# Patient Record
Sex: MALE | Race: BLACK OR AFRICAN AMERICAN
[De-identification: names, ages, dates, MRNs, and addresses within clinical notes are randomized per-mention and may not be internally consistent; named-entity substitution may affect disease eponyms.]

---

## 2017-06-29 ENCOUNTER — HOSPITAL ENCOUNTER (EMERGENCY)
Dept: HOSPITAL 17 - NEPE | Age: 52
LOS: 1 days | Discharge: HOME | End: 2017-06-30
Payer: SELF-PAY

## 2017-06-29 VITALS — HEIGHT: 71 IN | BODY MASS INDEX: 27.16 KG/M2 | WEIGHT: 194.01 LBS

## 2017-06-29 VITALS
HEART RATE: 90 BPM | TEMPERATURE: 98.3 F | RESPIRATION RATE: 20 BRPM | OXYGEN SATURATION: 99 % | DIASTOLIC BLOOD PRESSURE: 92 MMHG | SYSTOLIC BLOOD PRESSURE: 165 MMHG

## 2017-06-29 DIAGNOSIS — I25.2: ICD-10-CM

## 2017-06-29 DIAGNOSIS — I10: ICD-10-CM

## 2017-06-29 DIAGNOSIS — Z86.73: ICD-10-CM

## 2017-06-29 DIAGNOSIS — E10.9: ICD-10-CM

## 2017-06-29 DIAGNOSIS — Z79.4: ICD-10-CM

## 2017-06-29 DIAGNOSIS — N47.2: Primary | ICD-10-CM

## 2017-06-29 DIAGNOSIS — Z79.899: ICD-10-CM

## 2017-06-29 DIAGNOSIS — G47.30: ICD-10-CM

## 2017-06-29 PROCEDURE — 54235 NJX CORPORA CAVERNOSA RX AGT: CPT

## 2017-06-29 NOTE — PD
HPI


Chief Complaint:   Complaint


Time Seen by Provider:  22:25


Travel History


International Travel<30 days:  No


Contact w/Intl Traveler<30days:  No


Traveled to known affect area:  No





History of Present Illness


HPI


51-year-old male type 1 diabetes presents for evaluation of penile pain.  He 

reports that he retracted his foreskin 12 hours ago in order to clean his 

penis.  He was unable to replace the foreskin back into its anatomical position 

afterwards.  He has developed some pain and edema to the glans penis and to the 

foreskin.  The pain is moderate, aggravated by palpation, throbbing.  This 

happened once in the past, he reports that the symptoms improved on their own.  

He has no other complaints.





PFSH


Past Medical History


Cerebrovascular Accident:  Yes (CVA X2)


Diabetes:  Yes


Patient Takes Glucophage:  No


Diminished Hearing:  No


Hypertension:  Yes


Medical other:  Yes (neuropathy)


Respiratory:  Yes (previous trach )


Myocardial Infarction:  Yes


Sleep Apnea:  Yes





Social History


Alcohol Use:  No


Tobacco Use:  No


Substance Use:  No





Allergies-Medications


(Allergen,Severity, Reaction):  


Coded Allergies:  


     No Known Allergies (Unverified , 1/15/17)


Reported Meds & Prescriptions





Reported Meds & Active Scripts


Active


Reported


Potassium Chloride ER (Potassium Chloride) 10 Meq Cap 10 Meq PO BID


Furosemide 20 Mg Tab 20 Mg PO BID


Lisinopril 40 Mg Tab 40 Mg PO DAILY


Levemir Inj (Insulin Detemir) 1,000 unit/ 10 ML Vial 10 Units SQ BID


     Do not mix with any other Insulin.


Novolog Inj (Insulin Aspart) 1,000 Unit/10 Ml Vial 1 Units SQ ONCE








Review of Systems


General / Constitutional:  No: Fever, Chills


Gastrointestinal:  No: Nausea, Vomiting, Abdominal Pain


Genitourinary:  Positive: Other (positive for glans penis pain and swelling, 

foreskin pain and swelling),  No: Dysuria


Skin:  Positive Other (denies rash)





Physical Exam


Narrative


GENERAL: Well-developed well-nourished male in no acute distress


SKIN: Warm and dry.


HEAD: Atraumatic. Normocephalic. 


EYES: Pupils equal and round. No scleral icterus. No injection or drainage. 


ENT: No nasal bleeding or discharge.  Mucous membranes pink and moist.


NECK: Trachea midline. No JVD. 


CARDIOVASCULAR: Regular rate and rhythm.  No murmur appreciated.


RESPIRATORY: No accessory muscle use. Clear to auscultation. Breath sounds 

equal bilaterally. 


GASTROINTESTINAL: Abdomen soft, non-tender, nondistended. Hepatic and splenic 

margins not palpable. 


: Normal-appearing scrotum.  Descended testicles.  The glans penis and 

foreskin are markedly edematous and tender to palpation.  There is no urethral 

discharge.


MUSCULOSKELETAL: No obvious deformities. No clubbing.  No cyanosis.  No edema. 


NEUROLOGICAL: Awake and alert. No obvious cranial nerve deficits.  Motor 

grossly within normal limits. Normal speech.


PSYCHIATRIC: Appropriate mood and affect; insight and judgment normal.





Data


Data


Last Documented VS





Vital Signs








  Date Time  Temp Pulse Resp B/P Pulse Ox O2 Delivery O2 Flow Rate FiO2


 


6/29/17 22:14 98.3 90 20 165/92 99   








Orders





 Lidocaine Pf 1% Inj (Xylocaine-Mpf 1% In (6/29/17 22:45)


Oxycodone-Acetamin 5-325 Mg (Percocet (6/30/17 00:30)








MDM


Medical Decision Making


Medical Screen Exam Complete:  Yes


Emergency Medical Condition:  Yes


Medical Record Reviewed:  Yes


Differential Diagnosis


Paraphimosis, phimosis, necrosis, balanitis, balanoposthitis


Narrative Course


This is a 51-year-old male who resents with glans penis and foreskin pain.  He 

is unable to reduce his foreskin after retracting it to clean it several hours 

ago.  Examination reveals a paraphimosis with a swollen and tender foreskin and 

glans penis.  Unable to reduce it spontaneously at this time.  After verbal 

consent was obtained a penile block was performed.  The glans penis and 

foreskin were wrapped with Coban and then the foreskin was reduced over the 

swollen penis.  The glans penis reduction performed by Dr. Hayes.  Ice packs 

were applied.





Procedures


**Procedure Narrative**


Penile block: The penile shaft was prepped with Betadine.  1% lidocaine was 

used to anesthetize the proximal penis at the 10 and 2:00 positions.  Patient 

tolerated procedure well.








Sundar Sherman Jun 29, 2017 22:36

## 2017-06-30 NOTE — PD
Data


Data


Last Documented VS





Vital Signs








  Date Time  Temp Pulse Resp B/P Pulse Ox O2 Delivery O2 Flow Rate FiO2


 


6/29/17 22:14 98.3 90 20 165/92 99   








Orders





 Lidocaine Pf 1% Inj (Xylocaine-Mpf 1% In (6/29/17 22:45)


Oxycodone-Acetamin 5-325 Mg (Percocet (6/30/17 00:30)








MDM


Medical Record Reviewed:  Yes


Supervised Visit with PABLO:  Yes


Narrative Course


I, Dr. Hayes, have reviewed the advance practice practitioner's documentation 

and am in agreement, met with the patient face to face, made the diagnosis, and 

the medical decision making was done by me.  





*My assessment and Findings: 


Paraphimosis - patient is a 51-year-old male with history of type 1 diabetes, 

prescription for evaluation of paraphimosis.  Patient reports that he took a 

shower this morning and has been unable to retract his foreskin over his glans 

penis since this morning.  Patient reports that this happened multiple times in 

the past but he was able to retract his foreskin without difficultly.  





I was unable to manually retract foreskin, patient required penile block. After 

patient had penile block, Coban was wrapped over glans penis and foreskin was 

reduced. Ice was placed to help decrease swollen glands.





Patient was re-evaluated, patient has easily reducible foreskin reduction at 

this time, I did use a 25 gauge after I cleaned the skin with betadine and 

pierced the skin 3 times to try to reduce swelling by removing any excess 

fluid. I was unable to reduce excess fluid.  Direct pressure was placed over 

swollen glans to help reduce swelling





Patient with decreased swelling and resolution of paraphimosis. Signs and 

symptoms of when to return to ER was reviewed with patient.  Patient will 

return to ER immediately should he have return of symptoms.  He will follow up 

with urologist as outpatient


Diagnosis





 Primary Impression:  


 Paraphimosis


Referrals:  


Trell Zee MD


Patient Instructions:  Narcotic given in the ED, General Instructions





***Additional Instruction:


Please call urologist first thing in the morning for earliest follow up





Please return to the ER immediately if you are unable to retract your foreskin 

over your penis





Please place ice pack for 10 minutes at a time to your penis to help decrease 

swelling





Return to ER as needed


***Med/Other Pt SpecificInfo:  Prescription(s) given


Disposition:  01 DISCHARGE HOME


Condition:  Stable








Susan Hayes DO Jun 30, 2017 00:29

## 2017-07-02 ENCOUNTER — HOSPITAL ENCOUNTER (EMERGENCY)
Dept: HOSPITAL 17 - NEPC | Age: 52
Discharge: HOME | End: 2017-07-02
Payer: SELF-PAY

## 2017-07-02 VITALS
HEART RATE: 88 BPM | OXYGEN SATURATION: 97 % | DIASTOLIC BLOOD PRESSURE: 90 MMHG | RESPIRATION RATE: 20 BRPM | SYSTOLIC BLOOD PRESSURE: 137 MMHG | TEMPERATURE: 97.8 F

## 2017-07-02 VITALS
OXYGEN SATURATION: 99 % | HEART RATE: 81 BPM | DIASTOLIC BLOOD PRESSURE: 8 MMHG | SYSTOLIC BLOOD PRESSURE: 148 MMHG | RESPIRATION RATE: 14 BRPM

## 2017-07-02 VITALS — TEMPERATURE: 98.1 F

## 2017-07-02 VITALS
TEMPERATURE: 97.8 F | HEART RATE: 78 BPM | RESPIRATION RATE: 16 BRPM | SYSTOLIC BLOOD PRESSURE: 158 MMHG | OXYGEN SATURATION: 99 % | DIASTOLIC BLOOD PRESSURE: 82 MMHG

## 2017-07-02 VITALS — BODY MASS INDEX: 32.72 KG/M2 | WEIGHT: 233.69 LBS | HEIGHT: 71 IN

## 2017-07-02 VITALS
OXYGEN SATURATION: 98 % | DIASTOLIC BLOOD PRESSURE: 77 MMHG | RESPIRATION RATE: 16 BRPM | HEART RATE: 80 BPM | SYSTOLIC BLOOD PRESSURE: 164 MMHG

## 2017-07-02 DIAGNOSIS — Z79.4: ICD-10-CM

## 2017-07-02 DIAGNOSIS — E11.65: ICD-10-CM

## 2017-07-02 DIAGNOSIS — N47.2: Primary | ICD-10-CM

## 2017-07-02 LAB
ANION GAP SERPL CALC-SCNC: 7 MEQ/L (ref 5–15)
APTT BLD: 23.7 SEC (ref 24.3–30.1)
BASE EXCESS BLD CALC-SCNC: 2.4 MMOL/L (ref -2–2)
BASOPHILS # BLD AUTO: 0 TH/MM3 (ref 0–0.2)
BASOPHILS NFR BLD: 0.7 % (ref 0–2)
BENZODIAZEPINES PNL UR: 97 % (ref 90–100)
BLOOD GAS CARBOXYHEMOGLOBIN: 1.4 % (ref 0–4)
BLOOD GAS HCO3: 26 MMOL/L (ref 22–26)
BLOOD GAS OXYGEN CONTENT: 17.3 VOL % (ref 12–20)
BLOOD GAS PCO2: 39 MMHG (ref 38–42)
BUN SERPL-MCNC: 19 MG/DL (ref 7–18)
CHLORIDE SERPL-SCNC: 95 MEQ/L (ref 98–107)
CRITICAL VALUE: NO
DRAW SITE: (no result)
EOSINOPHIL # BLD: 0.1 TH/MM3 (ref 0–0.4)
EOSINOPHIL NFR BLD: 1.9 % (ref 0–4)
ERYTHROCYTE [DISTWIDTH] IN BLOOD BY AUTOMATED COUNT: 12.9 % (ref 11.6–17.2)
GFR SERPLBLD BASED ON 1.73 SQ M-ARVRAT: 48 ML/MIN (ref 89–?)
HCO3 BLD-SCNC: 27.8 MEQ/L (ref 21–32)
HCT VFR BLD CALC: 37.1 % (ref 39–51)
HEMO FLAGS: (no result)
INR PPP: 1 RATIO
LYMPHOCYTES # BLD AUTO: 1.9 TH/MM3 (ref 1–4.8)
LYMPHOCYTES NFR BLD AUTO: 32.1 % (ref 9–44)
MCH RBC QN AUTO: 29.6 PG (ref 27–34)
MCHC RBC AUTO-ENTMCNC: 35.1 % (ref 32–36)
MCV RBC AUTO: 84.4 FL (ref 80–100)
METHGB MFR BLDA: 0.6 % (ref 0–2)
MONOCYTES NFR BLD: 5.3 % (ref 0–8)
NEUTROPHILS # BLD AUTO: 3.5 TH/MM3 (ref 1.8–7.7)
NEUTROPHILS NFR BLD AUTO: 60 % (ref 16–70)
NUMBER OF ARTERIAL PUNCTURES: 1
O2/TOTAL GAS SETTING VFR VENT: 21 %
OXYGEN DEVICE: (no result)
PLATELET # BLD: 156 TH/MM3 (ref 150–450)
PO2 BLD: 104 MMHG (ref 61–120)
POTASSIUM SERPL-SCNC: 4.3 MEQ/L (ref 3.5–5.1)
PROTHROMBIN TIME: 10.9 SEC (ref 9.8–11.6)
RBC # BLD AUTO: 4.39 MIL/MM3 (ref 4.5–5.9)
SALICYLATES SERPL-MCNC: 12.7 G/DL (ref 12–16)
SODIUM SERPL-SCNC: 130 MEQ/L (ref 136–145)
STAT: YES
TEMP CORR TO: 98.6
ULNAR PULSE: PRESENT
WBC # BLD AUTO: 5.8 TH/MM3 (ref 4–11)

## 2017-07-02 PROCEDURE — 54161 CIRCUM 28 DAYS OR OLDER: CPT

## 2017-07-02 PROCEDURE — 99284 EMERGENCY DEPT VISIT MOD MDM: CPT

## 2017-07-02 PROCEDURE — 64450 NJX AA&/STRD OTHER PN/BRANCH: CPT

## 2017-07-02 PROCEDURE — 85610 PROTHROMBIN TIME: CPT

## 2017-07-02 PROCEDURE — 85025 COMPLETE CBC W/AUTO DIFF WBC: CPT

## 2017-07-02 PROCEDURE — 96374 THER/PROPH/DIAG INJ IV PUSH: CPT

## 2017-07-02 PROCEDURE — 80048 BASIC METABOLIC PNL TOTAL CA: CPT

## 2017-07-02 PROCEDURE — 85730 THROMBOPLASTIN TIME PARTIAL: CPT

## 2017-07-02 PROCEDURE — 82805 BLOOD GASES W/O2 SATURATION: CPT

## 2017-07-02 PROCEDURE — 00920 ANES PX MALE GENITALIA NOS: CPT

## 2017-07-02 PROCEDURE — 36600 WITHDRAWAL OF ARTERIAL BLOOD: CPT

## 2017-07-02 PROCEDURE — 93005 ELECTROCARDIOGRAM TRACING: CPT

## 2017-07-02 NOTE — PD
HPI


Chief Complaint:   Complaint


Time Seen by Provider:  15:53


Travel History


International Travel<30 days:  No


Contact w/Intl Traveler<30days:  No


Traveled to known affect area:  No





History of Present Illness


HPI


This patient presents complaining of penile pain and swelling.  He was seen 

here 2 days ago for paraphimosis.  His foreskin was reduced after penile block 

and he went home.  Patient reports that his foreskin is still stuck and he has 

increased swelling and pain.  He is having difficulty urinating now but is able 

to dribble out urine.  He reports that his shaft has become more swollen.  

Severity is moderate.  Duration 2 days.  No alleviating factors.  Patient is 

not circumcised





[facility]


Past Medical History


Cardiovascular Problems:  Yes


Cerebrovascular Accident:  Yes


Diabetes:  Yes (insulin )


Patient Takes Glucophage:  No


Diminished Hearing:  No


Hypertension:  Yes


Neurologic:  Yes (stroke)


Respiratory:  Yes (previous trach , sleep apne)


Myocardial Infarction:  Yes


Sleep Apnea:  Yes


Tetanus Vaccination:  > 5 Years


Influenza Vaccination:  Yes





Social History


Alcohol Use:  No (pt denies )


Tobacco Use:  No (pt denies)


Substance Use:  No (pt denies )





Allergies-Medications


(Allergen,Severity, Reaction):  


Coded Allergies:  


     No Known Allergies (Unverified , 7/2/17)


Reported Meds & Prescriptions





Reported Meds & Active Scripts


Active


Reported


Potassium Chloride ER (Potassium Chloride) 10 Meq Cap 10 Meq PO BID


Furosemide 20 Mg Tab 20 Mg PO BID


Lisinopril 40 Mg Tab 40 Mg PO DAILY


Levemir Inj (Insulin Detemir) 1,000 unit/ 10 ML Vial 10 Units SQ BID


     Do not mix with any other Insulin.


Novolog Inj (Insulin Aspart) 1,000 Unit/10 Ml Vial 1 Units SQ DAILY








Review of Systems


General / Constitutional:  No: Fever


Eyes:  No: Visual changes


HENT:  No: Headaches


Cardiovascular:  No: Chest Pain or Discomfort


Respiratory:  No: Shortness of Breath


Gastrointestinal:  No: Abdominal Pain


Genitourinary:  Positive: Hesitancy, Dribbling,  No: Dysuria


Musculoskeletal:  No: Pain


Skin:  No Rash


Neurologic:  No: Weakness


Psychiatric:  No: Depression


Endocrine:  No: Polydipsia


Hematologic/Lymphatic:  No: Easy Bruising





Physical Exam


Narrative


GENERAL: Well-nourished, well-developed patient in no apparent distress.


SKIN: Focused skin assessment reveals no rash and nodules. Skin is Warm and dry.


HEAD: Atraumatic. Normocephalic. 


EYES: Pupils equal and round. No scleral icterus. No injection or drainage. 


ENT: No nasal bleeding or discharge.  Mucous membranes pink and moist.


NECK: Trachea midline. No JVD. 


CARDIOVASCULAR: Regular rate and rhythm.  No murmur appreciated.


RESPIRATORY: No accessory muscle use. Clear to auscultation. Breath sounds 

equal bilaterally. 


GASTROINTESTINAL: Abdomen soft, non-tender, nondistended. Hepatic and splenic 

margins not palpable. 


MUSCULOSKELETAL: No obvious deformities. No clubbing.  No cyanosis.  No edema. 


NEUROLOGICAL: Awake and alert. No obvious cranial nerve deficits.  Motor 

grossly within normal limits. Normal speech.


PSYCHIATRIC: Appropriate mood and affect; insight and judgment normal.


: Patient has significant swelling of the penile shaft.  The distal part is 

worse.  There seems to be a tourniquet type effect of foreskin which is now 

ulcerating into the shaft.  The glans is swollen.





Data


Data


Last Documented VS





Vital Signs








  Date Time  Temp Pulse Resp B/P Pulse Ox O2 Delivery O2 Flow Rate FiO2


 


7/2/17 17:23 97.8 78 16 158/82 99 Room Air  








Orders





 Iv Access Insert/Monitor (7/2/17 16:47)


Complete Blood Count With Diff (7/2/17 16:47)


Basic Metabolic Panel (Bmp) (7/2/17 16:47)


Prothrombin Time / Inr (Pt) (7/2/17 16:47)


Act Partial Throm Time (Ptt) (7/2/17 16:47)


Electrocardiogram (7/2/17 )


Arterial Blood Gas (Abg) (7/2/17 )


Sodium Chlor 0.9% 1000 Ml Inj (Ns 1000 M (7/2/17 17:30)


Insulin Human Regular Inj (Novolin R Inj (7/2/17 17:30)


Cefazolin 2 Gm Premix (Ancef 2 Gm Premix (7/2/17 17:45)





Labs








 Laboratory Tests








Test 7/2/17 7/2/17





 16:50 17:37


 


White Blood Count 5.8 TH/MM3 


 


Red Blood Count 4.39 MIL/MM3 


 


Hemoglobin 13.0 GM/DL 


 


Hematocrit 37.1 % 


 


Mean Corpuscular Volume 84.4 FL 


 


Mean Corpuscular Hemoglobin 29.6 PG 


 


Mean Corpuscular Hemoglobin 35.1 % 





Concent  


 


Red Cell Distribution Width 12.9 % 


 


Platelet Count 156 TH/MM3 


 


Mean Platelet Volume 10.0 FL 


 


Neutrophils (%) (Auto) 60.0 % 


 


Lymphocytes (%) (Auto) 32.1 % 


 


Monocytes (%) (Auto) 5.3 % 


 


Eosinophils (%) (Auto) 1.9 % 


 


Basophils (%) (Auto) 0.7 % 


 


Neutrophils # (Auto) 3.5 TH/MM3 


 


Lymphocytes # (Auto) 1.9 TH/MM3 


 


Monocytes # (Auto) 0.3 TH/MM3 


 


Eosinophils # (Auto) 0.1 TH/MM3 


 


Basophils # (Auto) 0.0 TH/MM3 


 


CBC Comment DIFF FINAL  


 


Differential Comment   


 


Prothrombin Time 10.9 SEC 


 


Prothromb Time International 1.0 RATIO 





Ratio  


 


Activated Partial 23.7 SEC 





Thromboplast Time  


 


Sodium Level 130 MEQ/L 


 


Potassium Level 4.3 MEQ/L 


 


Chloride Level 95 MEQ/L 


 


Carbon Dioxide Level 27.8 MEQ/L 


 


Anion Gap 7 MEQ/L 


 


Blood Urea Nitrogen 19 MG/DL 


 


Creatinine 1.81 MG/DL 


 


Estimat Glomerular Filtration 48 ML/MIN 





Rate  


 


Random Glucose 431 MG/DL 


 


Calcium Level 8.8 MG/DL 


 


Blood Gas Puncture Site  RT RADIAL 


 


Blood Gas Patient Temperature  98.6 


 


Blood Gas HCO3  26 mmol/L


 


Blood Gas Base Excess  2.4 mmol/L


 


Blood Gas Oxygen Saturation  97 %


 


Arterial Blood pH  7.45 


 


Arterial Blood Partial  39 mmHg





Pressure CO2  


 


Arterial Blood Partial  104 mmHG





Pressure O2  


 


Arterial Blood Oxygen Content  17.3 Vol %


 


Arterial Blood  1.4 %





Carboxyhemoglobin  


 


Arterial Blood Methemoglobin  0.6 %


 


Blood Gas Hemoglobin  12.7 G/DL


 


Oxygen Delivery Device  ROOM AIR 


 


Blood Gas Inspired Oxygen  21 %














[facility]


Medical Decision Making


Medical Screen Exam Complete:  Yes


Emergency Medical Condition:  Yes


Medical Record Reviewed:  Yes


Differential Diagnosis


Paraphimosis, shaft edema, cellulitis


Narrative Course


I have reviewed the patient's electronic medical record.  I reviewed his note 

from 2 days ago including procedure note indicated reduction occurred





Review the case in detail with urologist on call Dr. Johnson.


It sounds like this patient requires surgical intervention for release and 

circumcision





I've ordered a preop workup


IV placed


I reviewed his EKG which shows sinus rhythm without ST elevation or ectopy


CBC is normal


Metabolic profile shows hyperglycemia of 431 with normal bicarbonate


Coagulation studies are normal


ABG is normal





Patient's hyperglycemia without DKA.  I gave him a liter normal saline IV and 

12 units IV regular insulin





Patient has an urgent need for operative reduction of his paraphimosis


He is heading to OR now and will likely be discharged after procedure





Urologist is driving in to evaluate him for operative fix





Diagnosis





 Primary Impression:  


 Paraphimosis


 Additional Impression:  


 Hyperglycemia due to type 2 diabetes mellitus


 Qualified Code:  E11.65 - Type 2 diabetes mellitus with hyperglycemia, with 

long-term current use of insulin





***Additional Instructions:


The patient was advised to follow up with their physician and return if they 

worsen.


***Med/Other Pt SpecificInfo:  Other


Disposition:  01 DISCHARGE HOME


Condition:  Stable








Dimitris Horner MD Jul 2, 2017 17:03

## 2017-07-03 NOTE — EKG
Date Performed: 07/02/2017       Time Performed: 16:56:24

 

PTAGE:      51 years

 

EKG:      Sinus rhythm 

 

 NONSPECIFIC T-WAVE ABNORMALITY BORDERLINE ECG

 

PREVIOUS TRACING       : 01/16/2017 01.14 Now consider anteroseptal myocardial infarction - age indet
erminate

 

DOCTOR:   Wilfredo Carr  Interpretating Date/Time  07/03/2017 14:09:53

## 2017-07-03 NOTE — MB
cc:

CHANG PETERSON MD

****

 

 

DATE OF CONSULTATION

07/02/2017

 

REASON FOR CONSULTATION

Paraphimosis x 4 days.

 

HISTORY OF PRESENT ILLNESS

The patient is a 51-year-old -American male with a history of diabetes

who presents today with a four-day history of paraphimosis.  He came to the ER

approximately four days ago after retracting his foreskin to clean his penis

and it got stuck.  He was unable to properly reduce it for almost 12 hours.  He

came to the ER where the ER physician apparently gave a penile block and

reduced it.  However, when he went home it remained stuck and swollen and very

painful and he came back to the ER for repeat evaluation.  He is having some

trouble urinating, but is able to drip out urine.  He thinks it is related to

the extreme pain he is having in his penis.

 

He denies fevers, chills, nausea or vomiting.  Denies history of kidney stones

or urinary tract infection.  Denies family history of genitourinary

malignancies.

 

MEDICATIONS

He does take aspirin 325 mg daily because he had a heart attack and stroke in

the past.

 

He states that he is definitely not circumcised.

 

REVIEW OF SYSTEMS

See HPI, otherwise all systems reviewed are otherwise are negative.

 

PAST HISTORY

1. Significant for CVA.

2. MI.

3. Diabetes.

4. Sleep apnea.

 

PAST SURGICAL HISTORY

Previous tracheostomy.

 

 

ALLERGIES

No known drug allergies.

 

REPORTED MEDICATIONS

1. Aspirin 325 mg daily.

2. Potassium chloride 10 mg p.o. b.i.d.

3. Furosemide 20 mg p.o. b.i.d.

4. Lisinopril 40 mg p.o. daily.

5. Levemir insulin.

6. NovoLog sliding scale.

 

FAMILY HISTORY

Denies urolithiasis or genitourinary malignancies.

 

SOCIAL HISTORY

Denies smoking, alcohol or illicit drug use.  Is currently .

 

PHYSICAL EXAMINATION

VITAL SIGNS: Temperature 97.8, pulse 78, respiration 16, /83.  Sating 99%

on room air.

GENERAL:  He is alert, oriented x 3.  In no apparent distress.  Pleasant,

cooperative male, appears his stated age.

HEAD:  Normocephalic, atraumatic.

EYES:  No scleral icterus.  Extraocular muscles intact.

NECK:  Supple.  Trachea is midline.  No JVD.

SKIN:  No ulcers or rashes.  Mucous membranes pink and moist.  LUNGS:  Clear to

auscultation bilaterally.  No wheezes, rales or rhonchi.

HEART:  Regular rhythm.  No murmurs, gallops or rubs.

ABDOMEN:  Soft, non-tender, non-distended.  Positive bowel sounds.  There is no

CVA tenderness bilaterally.

GENITOURINARY:  His penis is uncircumcised but diffusely red and swollen with

excoriation of the foreskin which is a constricting band stuck around the

coronal sulcus.  Testes descended bilaterally.  Normal size and consistency,

without mass.  EXTREMITIES:  Nontender.  No clubbing, cyanosis or edema.

PSYCH:  Normal affect.

NEUROLOGICAL:  Cranial nerves II-XII intact.  Strength 5/5 in all four

extremities.

 

LABS

White count 5.8, hemoglobin 13.0, hematocrit 37.1, platelet count 156.

Sodium 138, potassium 4.3, chloride 95, bicarb 27.8, BUN 19, creatinine 1.81,

glucose 431.

 

 

 

ASSESSMENT AND PLAN

The patient is a 51-year-old male with history of diabetes who presents with a

paraphimosis x 4 days.

 

PLAN

Due to the length of the paraphimosis and the extreme pain the patient is in,

elected to bring him back to the OR under anesthetic to reduce the

paraphimosis.  He is currently taking aspirin 325 mg daily due to his recent

history of heart attack and stroke.  Therefore, will only do surgery if

necessary due to the risk of bleeding.

 

I discussed the risks, benefits and alternatives with the patient and he

understood all the risks.  All questions were answered.  Informed consent was

obtained.

 

 

 

 

                              _________________________________

                              Chang Peterson MD

 

 

 

EMF/JACQUIE

D:  7/2/2017/6:59 PM

T:  7/3/2017/8:00 AM

Visit #:  [id number]

Job #:  [id number]

## 2017-07-04 NOTE — MP
cc:

CARLOS PETERSON MD

****

 

 

DATE OF SURGERY

7/2/17

 

PREOPERATIVE DIAGNOSIS

Paraphimosis x 4 days.

 

POSTOPERATIVE DIAGNOSIS

Paraphimosis x 4 days.

 

PROCEDURE PERFORMED

1. Reduction of paraphimosis.

2. Dorsal penile block.

 

SURGEON

MD Alex

 

ANESTHESIA

General.

 

COMPLICATIONS

None.

 

PREOP ANTIBIOTICS

Ancef 1 gram IV.

 

DRAINS

None.

 

SPECIMEN

None.

 

BLOOD LOSS

Less than 5 ml.

 

DISPOSITION

To recovery.

 

INDICATIONS

The patient is a 51-year-old -American male with a history of diabetes

who presented to the ER today with a 4 day history of paraphimosis. He

initially presented approximately 4 days ago with a similar issue. He was

apparently reduced in the ER by the ER physician and was sent home to follow up

with Dr. Zee. However, the patient stated the pain and swelling just got

worse over the last couple days and he came back to the ER for further

evaluation.  The patient takes aspirin 325 milligrams daily due to recent heart

attack and stroke.  I discussed management options with him including the need

for a possible circumcision. Since it has been 4 days of paraphimosis and to be

extraordinarily painful it was elected to be done under anesthetic. The risks,

benefits, alternatives were explained to the patient including a possible need

for circumcision.  He elected to proceeded and informed consent was obtained.

 

PROCEDURE IN DETAIL

The patient was appropriately identified, brought back to the operating room,

was laid supine on the operating room table. Appropriate time-out was performed

under direction of anesthesiology. The patient ___ to be reduced under general

aesthetic.  Preoperative antibiotics in the form of Ancef 1 gram IV was given

within one hour of the start of the procedure.  The patient was then prepped,

draped in normal sterile surgical fashion. The glans of the penis and

surrounding foreskin were quite edematous. The constricting band was quite

obvious. There was some excoriation of the skin. At this time for approximately

5 minutes I did squeeze the penile edema out of the glans of the penis and I

was able to easily reduce the paraphimosis successfully. Due to the fact that

he is on aspirin 325 milligrams daily I elected not to proceed with a

circumcision due to the risk of bleeding.  A dorsal penile block was then

performed at the end using 10 cc of 0.5% Marcaine. The patient was extubated

and sent to recovery in stable condition. He will be discharged home per PACU

protocol.

 

 

 

                              _________________________________

                              MD YURY Nelson/CECE

D:  7/3/2017/12:43 PM

T:  7/4/2017/5:48 PM

Visit #:  [id number]

Job #:  [id number]

## 2018-05-01 ENCOUNTER — HOSPITAL ENCOUNTER (EMERGENCY)
Dept: HOSPITAL 17 - NEPE | Age: 53
Discharge: HOME | End: 2018-05-01
Payer: COMMERCIAL

## 2018-05-01 VITALS — BODY MASS INDEX: 28.09 KG/M2 | HEIGHT: 71 IN | WEIGHT: 200.62 LBS

## 2018-05-01 VITALS
SYSTOLIC BLOOD PRESSURE: 193 MMHG | OXYGEN SATURATION: 98 % | DIASTOLIC BLOOD PRESSURE: 110 MMHG | HEART RATE: 68 BPM | RESPIRATION RATE: 17 BRPM

## 2018-05-01 VITALS
SYSTOLIC BLOOD PRESSURE: 159 MMHG | HEART RATE: 86 BPM | OXYGEN SATURATION: 98 % | RESPIRATION RATE: 24 BRPM | DIASTOLIC BLOOD PRESSURE: 106 MMHG

## 2018-05-01 VITALS
DIASTOLIC BLOOD PRESSURE: 100 MMHG | TEMPERATURE: 98.3 F | SYSTOLIC BLOOD PRESSURE: 176 MMHG | OXYGEN SATURATION: 98 % | HEART RATE: 94 BPM | RESPIRATION RATE: 19 BRPM

## 2018-05-01 VITALS
RESPIRATION RATE: 19 BRPM | DIASTOLIC BLOOD PRESSURE: 112 MMHG | HEART RATE: 70 BPM | OXYGEN SATURATION: 98 % | SYSTOLIC BLOOD PRESSURE: 184 MMHG

## 2018-05-01 VITALS
DIASTOLIC BLOOD PRESSURE: 112 MMHG | RESPIRATION RATE: 12 BRPM | SYSTOLIC BLOOD PRESSURE: 176 MMHG | HEART RATE: 70 BPM | OXYGEN SATURATION: 100 %

## 2018-05-01 DIAGNOSIS — E11.65: Primary | ICD-10-CM

## 2018-05-01 DIAGNOSIS — Z79.4: ICD-10-CM

## 2018-05-01 LAB
BACTERIA #/AREA URNS HPF: (no result) /HPF
BASOPHILS # BLD AUTO: 0 TH/MM3 (ref 0–0.2)
BASOPHILS NFR BLD: 0.3 % (ref 0–2)
BUN SERPL-MCNC: 17 MG/DL (ref 7–18)
CALCIUM SERPL-MCNC: 8.9 MG/DL (ref 8.5–10.1)
CHLORIDE SERPL-SCNC: 97 MEQ/L (ref 98–107)
COLOR UR: (no result)
CREAT SERPL-MCNC: 1.53 MG/DL (ref 0.6–1.3)
EOSINOPHIL # BLD: 0.2 TH/MM3 (ref 0–0.4)
EOSINOPHIL NFR BLD: 3.8 % (ref 0–4)
ERYTHROCYTE [DISTWIDTH] IN BLOOD BY AUTOMATED COUNT: 13 % (ref 11.6–17.2)
GFR SERPLBLD BASED ON 1.73 SQ M-ARVRAT: 58 ML/MIN (ref 89–?)
GLUCOSE SERPL-MCNC: 403 MG/DL (ref 74–106)
GLUCOSE UR STRIP-MCNC: 1000 MG/DL
HCO3 BLD-SCNC: 27.4 MEQ/L (ref 21–32)
HCT VFR BLD CALC: 39.3 % (ref 39–51)
HGB BLD-MCNC: 14.2 GM/DL (ref 13–17)
HGB UR QL STRIP: (no result)
KETONES UR STRIP-MCNC: (no result) MG/DL
LYMPHOCYTES # BLD AUTO: 2 TH/MM3 (ref 1–4.8)
LYMPHOCYTES NFR BLD AUTO: 39.6 % (ref 9–44)
MCH RBC QN AUTO: 30.1 PG (ref 27–34)
MCHC RBC AUTO-ENTMCNC: 36.2 % (ref 32–36)
MCV RBC AUTO: 83 FL (ref 80–100)
MONOCYTE #: 0.3 TH/MM3 (ref 0–0.9)
MONOCYTES NFR BLD: 5.2 % (ref 0–8)
NEUTROPHILS # BLD AUTO: 2.6 TH/MM3 (ref 1.8–7.7)
NEUTROPHILS NFR BLD AUTO: 51.1 % (ref 16–70)
NITRITE UR QL STRIP: (no result)
PLATELET # BLD: 185 TH/MM3 (ref 150–450)
PMV BLD AUTO: 8.9 FL (ref 7–11)
RBC # BLD AUTO: 4.74 MIL/MM3 (ref 4.5–5.9)
SODIUM SERPL-SCNC: 133 MEQ/L (ref 136–145)
SP GR UR STRIP: 1.02 (ref 1–1.03)
SQUAMOUS #/AREA URNS HPF: 12 /HPF (ref 0–5)
URINE LEUKOCYTE ESTERASE: (no result)
WBC # BLD AUTO: 5 TH/MM3 (ref 4–11)

## 2018-05-01 PROCEDURE — 85025 COMPLETE CBC W/AUTO DIFF WBC: CPT

## 2018-05-01 PROCEDURE — 96372 THER/PROPH/DIAG INJ SC/IM: CPT

## 2018-05-01 PROCEDURE — 96360 HYDRATION IV INFUSION INIT: CPT

## 2018-05-01 PROCEDURE — 99284 EMERGENCY DEPT VISIT MOD MDM: CPT

## 2018-05-01 PROCEDURE — 80048 BASIC METABOLIC PNL TOTAL CA: CPT

## 2018-05-01 PROCEDURE — 81001 URINALYSIS AUTO W/SCOPE: CPT

## 2018-05-07 ENCOUNTER — HOSPITAL ENCOUNTER (EMERGENCY)
Dept: HOSPITAL 17 - NEPE | Age: 53
Discharge: HOME | End: 2018-05-07
Payer: COMMERCIAL

## 2018-05-07 VITALS
DIASTOLIC BLOOD PRESSURE: 100 MMHG | RESPIRATION RATE: 20 BRPM | TEMPERATURE: 99.3 F | HEART RATE: 88 BPM | SYSTOLIC BLOOD PRESSURE: 182 MMHG | OXYGEN SATURATION: 99 %

## 2018-05-07 DIAGNOSIS — E87.1: ICD-10-CM

## 2018-05-07 DIAGNOSIS — I25.2: ICD-10-CM

## 2018-05-07 DIAGNOSIS — I10: ICD-10-CM

## 2018-05-07 DIAGNOSIS — E11.65: ICD-10-CM

## 2018-05-07 DIAGNOSIS — Z79.4: ICD-10-CM

## 2018-05-07 DIAGNOSIS — E87.6: ICD-10-CM

## 2018-05-07 DIAGNOSIS — L02.31: Primary | ICD-10-CM

## 2018-05-07 LAB
ALBUMIN SERPL-MCNC: 2.6 GM/DL (ref 3.4–5)
ALP SERPL-CCNC: 111 U/L (ref 45–117)
ALT SERPL-CCNC: 14 U/L (ref 12–78)
AST SERPL-CCNC: 17 U/L (ref 15–37)
BASOPHILS # BLD AUTO: 0 TH/MM3 (ref 0–0.2)
BASOPHILS NFR BLD: 0.3 % (ref 0–2)
BILIRUB SERPL-MCNC: 0.7 MG/DL (ref 0.2–1)
BUN SERPL-MCNC: 14 MG/DL (ref 7–18)
CALCIUM SERPL-MCNC: 8.9 MG/DL (ref 8.5–10.1)
CHLORIDE SERPL-SCNC: 90 MEQ/L (ref 98–107)
CREAT SERPL-MCNC: 1.75 MG/DL (ref 0.6–1.3)
EOSINOPHIL # BLD: 0.1 TH/MM3 (ref 0–0.4)
EOSINOPHIL NFR BLD: 1.1 % (ref 0–4)
ERYTHROCYTE [DISTWIDTH] IN BLOOD BY AUTOMATED COUNT: 13 % (ref 11.6–17.2)
GFR SERPLBLD BASED ON 1.73 SQ M-ARVRAT: 50 ML/MIN (ref 89–?)
GLUCOSE SERPL-MCNC: 454 MG/DL (ref 74–106)
HCO3 BLD-SCNC: 29.8 MEQ/L (ref 21–32)
HCT VFR BLD CALC: 39.7 % (ref 39–51)
HGB BLD-MCNC: 14.2 GM/DL (ref 13–17)
LYMPHOCYTES # BLD AUTO: 1.6 TH/MM3 (ref 1–4.8)
LYMPHOCYTES NFR BLD AUTO: 14.5 % (ref 9–44)
MCH RBC QN AUTO: 30 PG (ref 27–34)
MCHC RBC AUTO-ENTMCNC: 35.8 % (ref 32–36)
MCV RBC AUTO: 83.8 FL (ref 80–100)
MONOCYTE #: 0.6 TH/MM3 (ref 0–0.9)
MONOCYTES NFR BLD: 5.6 % (ref 0–8)
NEUTROPHILS # BLD AUTO: 8.6 TH/MM3 (ref 1.8–7.7)
NEUTROPHILS NFR BLD AUTO: 78.5 % (ref 16–70)
PLATELET # BLD: 167 TH/MM3 (ref 150–450)
PMV BLD AUTO: 9.3 FL (ref 7–11)
PROT SERPL-MCNC: 7.5 GM/DL (ref 6.4–8.2)
RBC # BLD AUTO: 4.74 MIL/MM3 (ref 4.5–5.9)
SODIUM SERPL-SCNC: 128 MEQ/L (ref 136–145)
WBC # BLD AUTO: 10.9 TH/MM3 (ref 4–11)

## 2018-05-07 PROCEDURE — 99284 EMERGENCY DEPT VISIT MOD MDM: CPT

## 2018-05-07 PROCEDURE — 96361 HYDRATE IV INFUSION ADD-ON: CPT

## 2018-05-07 PROCEDURE — 96374 THER/PROPH/DIAG INJ IV PUSH: CPT

## 2018-05-07 PROCEDURE — 10060 I&D ABSCESS SIMPLE/SINGLE: CPT

## 2018-05-07 PROCEDURE — 80053 COMPREHEN METABOLIC PANEL: CPT

## 2018-05-07 PROCEDURE — 85025 COMPLETE CBC W/AUTO DIFF WBC: CPT

## 2018-05-07 NOTE — PD
Physical Exam


Date Seen by Provider:  May 7, 2018


Time Seen by Provider:  17:46





Data


Data


Last Documented VS





Vital Signs








  Date Time  Temp Pulse Resp B/P (MAP) Pulse Ox O2 Delivery O2 Flow Rate FiO2


 


5/7/18 15:19  88 18  99 Room Air  


 


5/7/18 15:14 99.3   182/100 (127)    








Orders





 Orders


Complete Blood Count With Diff (5/7/18 15:21)


Comprehensive Metabolic Panel (5/7/18 15:21)


^ Insert Iv (5/7/18 15:21)


Sodium Chlor 0.9% 1000 Ml Inj (Ns 1000 M (5/7/18 15:30)


Lidocaine 1% Inj (50 Ml) (Xylocaine 1% I (5/7/18 15:30)


Insulin Human Regular Inj (Novolin R Inj (5/7/18 16:45)


Sodium Chlor 0.9% 1000 Ml Inj (Ns 1000 M (5/7/18 16:45)


Potassium Chloride Eff (K-Lyte Cl  Eff) (5/7/18 17:45)





Labs





Laboratory Tests








Test


  5/7/18


15:38


 


White Blood Count 10.9 TH/MM3 


 


Red Blood Count 4.74 MIL/MM3 


 


Hemoglobin 14.2 GM/DL 


 


Hematocrit 39.7 % 


 


Mean Corpuscular Volume 83.8 FL 


 


Mean Corpuscular Hemoglobin 30.0 PG 


 


Mean Corpuscular Hemoglobin


Concent 35.8 % 


 


 


Red Cell Distribution Width 13.0 % 


 


Platelet Count 167 TH/MM3 


 


Mean Platelet Volume 9.3 FL 


 


Neutrophils (%) (Auto) 78.5 % 


 


Lymphocytes (%) (Auto) 14.5 % 


 


Monocytes (%) (Auto) 5.6 % 


 


Eosinophils (%) (Auto) 1.1 % 


 


Basophils (%) (Auto) 0.3 % 


 


Neutrophils # (Auto) 8.6 TH/MM3 


 


Lymphocytes # (Auto) 1.6 TH/MM3 


 


Monocytes # (Auto) 0.6 TH/MM3 


 


Eosinophils # (Auto) 0.1 TH/MM3 


 


Basophils # (Auto) 0.0 TH/MM3 


 


CBC Comment DIFF FINAL 


 


Differential Comment  


 


Blood Urea Nitrogen 14 MG/DL 


 


Creatinine 1.75 MG/DL 


 


Random Glucose 454 MG/DL 


 


Total Protein 7.5 GM/DL 


 


Albumin 2.6 GM/DL 


 


Calcium Level 8.9 MG/DL 


 


Alkaline Phosphatase 111 U/L 


 


Aspartate Amino Transf


(AST/SGOT) 17 U/L 


 


 


Alanine Aminotransferase


(ALT/SGPT) 14 U/L 


 


 


Total Bilirubin 0.7 MG/DL 


 


Sodium Level 128 MEQ/L 


 


Potassium Level 3.0 MEQ/L 


 


Chloride Level 90 MEQ/L 


 


Carbon Dioxide Level 29.8 MEQ/L 


 


Anion Gap 8 MEQ/L 


 


Estimat Glomerular Filtration


Rate 50 ML/MIN 


 











[facility]


Medical Record Reviewed:  Yes


Supervised Visit with PABLO:  Yes


Narrative Course


I was asked by Dr. Garcia to perform an incision and drainage on this patient.  

Please see her note for further details.


Procedures


**Procedure Narrative**


INCISION AND DRAINAGE OF ABSCESS: The area was prepped and was sterilely 

draped.  A subcutaneous wheal of 1% lidocaine with a total number 5 mL was used 

to anesthetize the area properly.  A number 11 scalpel was used to make a 1 cm 

incision across the area of the abscess.  The abscess was drained, complex 

loculations were broken down, and irrigated with normal saline.  Sterile 

dressing applied.





Diagnosis





 Primary Impression:  


 Hyperglycemia due to type 2 diabetes mellitus


 Qualified Codes:  E11.65 - Type 2 diabetes mellitus with hyperglycemia; Z79.4 

- Long term (current) use of insulin


 Additional Impression:  


 Abscess


Patient Instructions:  General Instructions





***Additional Instruction:  


If you develop fever, increasing redness, warmth, or spreading of your infection

, or severe pain return to the emergency department immediately as you may 

require antibiotics through your IV.





Complete your course of antibiotics as prescribed.


Scripts


Sulfamethoxazole-Trimethoprim (Bactrim DS) 800-160 Mg Tab


1 TAB PO BID for Infection, #20 TAB 0 Refills


   Prov: Lin Garcia MD         5/7/18


Disposition:  01 DISCHARGE HOME


Condition:  Stable











Katy Navas May 7, 2018 17:47

## 2018-05-07 NOTE — PD
HPI


Chief Complaint:  Diabetic


Time Seen by Provider:  15:15


Travel History


International Travel<30 days:  No


Contact w/Intl Traveler<30days:  No


Traveled to known affect area:  No





History of Present Illness


HPI


This is a 52-year-old male who presents to the emergency department with pain 

on his buttock area, constant, moderate severity it has been going on for 1 

week and worsening.  He denies any fevers or chills.  He does have a history of 

diabetes and he says that despite being in the emergency department 1 week ago 

and receiving prescriptions for insulin he was unable to fill it because he did 

not have a ride to get to the drugstore.





PFSH


Past Medical History


Cardiovascular Problems:  Yes


Cerebrovascular Accident:  Yes


Diabetes:  Yes


Patient Takes Glucophage:  No


Diminished Hearing:  No


Hypertension:  Yes


Neurologic:  Yes (stroke)


Respiratory:  Yes (previous trach )


Myocardial Infarction:  Yes


Sleep Apnea:  Yes





Past Surgical History


Surgical History:  No Previous Surgery


Other Surgery:  Yes (TRACHED/FEEDING TUBE PLACED AND REMOVED-?2006)





Social History


Alcohol Use:  No


Tobacco Use:  No


Substance Use:  No





Allergies-Medications


(Allergen,Severity, Reaction):  


Coded Allergies:  


     No Known Allergies (Unverified  Adverse Reaction, Unknown, 5/1/18)


Reported Meds & Prescriptions





Reported Meds & Active Scripts


Active


Blood Glucose Meter (Blood-Glucose Meter) 1 Each Each Ea  


Blood Glucose Test Strips Strips Strip Ea .XX AS DIRECTED


Levemir Inj (Insulin Detemir) 1,000 unit/ 10 ML Vial 10 Units SQ BID 30 Days


     Do not mix with any other Insulin.


Novolog Inj (Insulin Aspart) 1,000 Unit/10 Ml Vial 1 Units SQ DAILY


Reported


Potassium Chloride ER (Potassium Chloride) 10 Meq Cap 10 Meq PO BID


Furosemide 20 Mg Tab 20 Mg PO BID


Lisinopril 40 Mg Tab 40 Mg PO DAILY








Review of Systems


Except as stated in HPI:  all other systems reviewed are Neg





Physical Exam


Narrative


GENERAL:Well appearing, no acute distress


SKIN: 4 cm area of induration along the left buttock crease with no perirectal 

and perianal involvement


HEAD: Atraumatic. Normocephalic. 


EYES: Pupils equal and round.  No injection or drainage. 


ENT:  Moist mucous membranes


NECK: Trachea midline. 


CARDIOVASCULAR: Regular rate and rhythm.  No murmur appreciated.


RESPIRATORY: Clear to auscultation. Breath sounds equal bilaterally. 


GASTROINTESTINAL: Abdomen soft, non-tender, nondistended. 


MUSCULOSKELETAL: No obvious deformities. 


NEUROLOGICAL: Awake and alert. No obvious cranial nerve deficits.   


PSYCHIATRIC: Appropriate mood and affect; insight and judgment normal.





Data


Data


Last Documented VS





Vital Signs








  Date Time  Temp Pulse Resp B/P (MAP) Pulse Ox O2 Delivery O2 Flow Rate FiO2


 


5/7/18 15:19  88 18  99 Room Air  


 


5/7/18 15:14 99.3   182/100 (127)    








Orders





 Orders


Complete Blood Count With Diff (5/7/18 15:21)


Comprehensive Metabolic Panel (5/7/18 15:21)


^ Insert Iv (5/7/18 15:21)


Sodium Chlor 0.9% 1000 Ml Inj (Ns 1000 M (5/7/18 15:30)


Lidocaine 1% Inj (50 Ml) (Xylocaine 1% I (5/7/18 15:30)


Insulin Human Regular Inj (Novolin R Inj (5/7/18 16:45)


Sodium Chlor 0.9% 1000 Ml Inj (Ns 1000 M (5/7/18 16:45)





Labs





Laboratory Tests








Test


  5/7/18


15:38


 


White Blood Count 10.9 TH/MM3 


 


Red Blood Count 4.74 MIL/MM3 


 


Hemoglobin 14.2 GM/DL 


 


Hematocrit 39.7 % 


 


Mean Corpuscular Volume 83.8 FL 


 


Mean Corpuscular Hemoglobin 30.0 PG 


 


Mean Corpuscular Hemoglobin


Concent 35.8 % 


 


 


Red Cell Distribution Width 13.0 % 


 


Platelet Count 167 TH/MM3 


 


Mean Platelet Volume 9.3 FL 


 


Neutrophils (%) (Auto) 78.5 % 


 


Lymphocytes (%) (Auto) 14.5 % 


 


Monocytes (%) (Auto) 5.6 % 


 


Eosinophils (%) (Auto) 1.1 % 


 


Basophils (%) (Auto) 0.3 % 


 


Neutrophils # (Auto) 8.6 TH/MM3 


 


Lymphocytes # (Auto) 1.6 TH/MM3 


 


Monocytes # (Auto) 0.6 TH/MM3 


 


Eosinophils # (Auto) 0.1 TH/MM3 


 


Basophils # (Auto) 0.0 TH/MM3 


 


CBC Comment DIFF FINAL 


 


Differential Comment  


 


Blood Urea Nitrogen 14 MG/DL 


 


Creatinine 1.75 MG/DL 


 


Random Glucose 454 MG/DL 


 


Total Protein 7.5 GM/DL 


 


Albumin 2.6 GM/DL 


 


Calcium Level 8.9 MG/DL 


 


Alkaline Phosphatase 111 U/L 


 


Aspartate Amino Transf


(AST/SGOT) 17 U/L 


 


 


Alanine Aminotransferase


(ALT/SGPT) 14 U/L 


 


 


Total Bilirubin 0.7 MG/DL 


 


Sodium Level 128 MEQ/L 


 


Potassium Level 3.0 MEQ/L 


 


Chloride Level 90 MEQ/L 


 


Carbon Dioxide Level 29.8 MEQ/L 


 


Anion Gap 8 MEQ/L 


 


Estimat Glomerular Filtration


Rate 50 ML/MIN 


 











MDM


Medical Decision Making


Medical Screen Exam Complete:  Yes


Emergency Medical Condition:  Yes


Interpretation(s)


temperature 99.3, hypertension


no leukocytosis


mild hyponatremia, hypokalemia


renal insufficiency


hyperglycemia


Differential Diagnosis


Abscess, perirectal abscess, perianal abscess, hyperglycemia, electrolyte 

abnormality


Narrative Course


This is a 52-year-old male who presents to the emergency department with 

swelling and pain in his buttock area.  He does have an area of induration 

along the buttock crease which was incised.  I think the abscess is too early 

for drainage.  I advised him to continue warm compresses and he will be 

prescribed antibiotics.  He was given IV fluids and IV insulin.  He failed to 

 his insulin prescriptions.  I encouraged him to do so.  Otherwise I 

think patient can be discharged home in a see any reason the patient warrants 

admission.





Diagnosis





 Primary Impression:  


 Hyperglycemia due to type 2 diabetes mellitus


 Qualified Codes:  E11.65 - Type 2 diabetes mellitus with hyperglycemia; Z79.4 

- Long term (current) use of insulin


 Additional Impression:  


 Abscess


Patient Instructions:  General Instructions





***Additional Instructions:  


If you develop fever, increasing redness, warmth, or spreading of your infection

, or severe pain return to the emergency department immediately as you may 

require antibiotics through your IV.





Complete your course of antibiotics as prescribed.


***Med/Other Pt SpecificInfo:  Prescription(s) given


Scripts


Sulfamethoxazole-Trimethoprim (Bactrim DS) 800-160 Mg Tab


1 TAB PO BID for Infection, #20 TAB 0 Refills


   Prov: Lin Garcia MD         5/7/18


Disposition:  01 DISCHARGE HOME


Condition:  Stable











Lin Garcia MD May 7, 2018 16:34

## 2024-11-14 NOTE — PD
14-Nov-2024 09:22
HPI


Chief Complaint:  Diabetic


Time Seen by Provider:  10:27


Travel History


International Travel<30 days:  No


Contact w/Intl Traveler<30days:  No


Traveled to known affect area:  No





History of Present Illness


HPI


52-year-old man presents to the emergency department when they felt lightheaded 

dizzy and off for the past couple weeks.  Is a history of diabetes and CAD.  

Has not been on medications in quite some time.  He states he has a primary 

physician but has not seen them yet.  No frequent urination.  No other specific 

symptoms.  No other complaints.





History


Past Medical History


*** Narrative Medical


CAD


Diabetes





Social History


Alcohol Use:  No


Tobacco Use:  No





Allergies-Medications


(Allergen,Severity, Reaction):  


Coded Allergies:  


     No Known Allergies (Unverified  Adverse Reaction, Unknown, 5/1/18)


Reported Meds & Prescriptions





Reported Meds & Active Scripts


Active


Reported


Potassium Chloride ER (Potassium Chloride) 10 Meq Cap 10 Meq PO BID


Furosemide 20 Mg Tab 20 Mg PO BID


Lisinopril 40 Mg Tab 40 Mg PO DAILY


Levemir Inj (Insulin Detemir) 1,000 unit/ 10 ML Vial 10 Units SQ BID


     Do not mix with any other Insulin.


Novolog Inj (Insulin Aspart) 1,000 Unit/10 Ml Vial 1 Units SQ DAILY








Review of Systems


Except as stated in HPI:  all other systems reviewed are Neg





Physical Exam


Narrative


GENERAL: Well-appearing 52-year-old man, no acute distress.


SKIN: Focused skin assessment warm/dry.


HEAD: Atraumatic. Normocephalic. 


EYES: Pupils equal and round. No scleral icterus. No injection or drainage. 


ENT: No nasal bleeding or discharge.  Mucous membranes pink and moist.


NECK: Trachea midline. No JVD. 


CARDIOVASCULAR: Regular rate and rhythm.  No murmur appreciated.


RESPIRATORY: No accessory muscle use. Clear to auscultation. Breath sounds 

equal bilaterally. 


GASTROINTESTINAL: Abdomen soft, non-tender, nondistended. Hepatic and splenic 

margins not palpable. 


MUSCULOSKELETAL: No obvious deformities. No clubbing.  No cyanosis.  No edema. 


NEUROLOGICAL: Awake and alert. No obvious cranial nerve deficits.  Motor 

grossly within normal limits. Normal speech.


PSYCHIATRIC: Appropriate mood and affect; insight and judgment normal.





Data


Data


Last Documented VS





Vital Signs








  Date Time  Temp Pulse Resp B/P (MAP) Pulse Ox O2 Delivery O2 Flow Rate FiO2


 


5/1/18 12:44  70 12 176/112 (133) 100 Room Air  


 


5/1/18 10:20 98.3       








Orders





 Orders


Complete Blood Count With Diff (5/1/18 10:33)


Basic Metabolic Panel (Bmp) (5/1/18 10:33)


Urinalysis - C+S If Indicated (5/1/18 10:33)


Iv Access Insert/Monitor (5/1/18 10:33)


Sodium Chlor 0.9% 1000 Ml Inj (Ns 1000 M (5/1/18 10:45)





Labs





Laboratory Tests








Test


  5/1/18


11:05 5/1/18


12:58


 


White Blood Count 5.0 TH/MM3  


 


Red Blood Count 4.74 MIL/MM3  


 


Hemoglobin 14.2 GM/DL  


 


Hematocrit 39.3 %  


 


Mean Corpuscular Volume 83.0 FL  


 


Mean Corpuscular Hemoglobin 30.1 PG  


 


Mean Corpuscular Hemoglobin


Concent 36.2 % 


  


 


 


Red Cell Distribution Width 13.0 %  


 


Platelet Count 185 TH/MM3  


 


Mean Platelet Volume 8.9 FL  


 


Neutrophils (%) (Auto) 51.1 %  


 


Lymphocytes (%) (Auto) 39.6 %  


 


Monocytes (%) (Auto) 5.2 %  


 


Eosinophils (%) (Auto) 3.8 %  


 


Basophils (%) (Auto) 0.3 %  


 


Neutrophils # (Auto) 2.6 TH/MM3  


 


Lymphocytes # (Auto) 2.0 TH/MM3  


 


Monocytes # (Auto) 0.3 TH/MM3  


 


Eosinophils # (Auto) 0.2 TH/MM3  


 


Basophils # (Auto) 0.0 TH/MM3  


 


CBC Comment AUTO DIFF  


 


Differential Comment


  AUTO DIFF


CONFIRMED 


 


 


Blood Urea Nitrogen 17 MG/DL  


 


Creatinine 1.53 MG/DL  


 


Random Glucose 403 MG/DL  


 


Calcium Level 8.9 MG/DL  


 


Sodium Level 133 MEQ/L  


 


Potassium Level 3.6 MEQ/L  


 


Chloride Level 97 MEQ/L  


 


Carbon Dioxide Level 27.4 MEQ/L  


 


Anion Gap 9 MEQ/L  


 


Estimat Glomerular Filtration


Rate 58 ML/MIN 


  


 


 


Urine Color  LIGHT-YELLOW 


 


Urine Turbidity  HAZY 


 


Urine pH  6.5 


 


Urine Specific Gravity  1.019 


 


Urine Protein  100 mg/dL 


 


Urine Glucose (UA)  1000 mg/dL 


 


Urine Ketones  NEG mg/dL 


 


Urine Occult Blood  NEG 


 


Urine Nitrite  NEG 


 


Urine Bilirubin  NEG 


 


Urine Urobilinogen


  


  LESS THAN 2.0


MG/DL


 


Urine Leukocyte Esterase  MOD 


 


Urine RBC  6 /hpf 


 


Urine WBC  4 /hpf 


 


Urine Squamous Epithelial


Cells 


  12 /hpf 


 


 


Urine Bacteria  RARE /hpf 


 


Microscopic Urinalysis Comment


  


  CULT NOT


INDICATED











MDM


Medical Decision Making


Medical Screen Exam Complete:  Yes


Emergency Medical Condition:  Yes


Interpretation(s)


LABS:


CBC is unremarkable


BMP is unremarkable


Glucose 403


UA with some glucose


Differential Diagnosis


Hyperglycemia, weakness, dehydration, other


Narrative Course


Medical decision making





52-year-old man with hyperglycemia due to type 2 diabetes.  Some renal 

insufficiency.  Will renew his insulin prescriptions.  He is used before.  

Patient states he has a primary physician.  Recommend outpatient follow-up.





Diagnosis





 Primary Impression:  


 Hyperglycemia due to type 2 diabetes mellitus


Patient Instructions:  General Instructions





***Additional Instructions:  


Take medications as prescribed.





Follow with her primary doctor this week.





Return to the emergency department for any new or worsening symptoms.


***Med/Other Pt SpecificInfo:  Prescription(s) given


Scripts


Blood-Glucose Meter (Blood Glucose Meter) 1 Each Each


EA, #1


   Prov: Freddy Bustamante MD         5/1/18 


Blood Glucose Test Strips (Blood Glucose Test Strips) Strips Strip


EA .XX AS DIRECTED for Blood Sugar Management, #1 0 Refills


   Prov: Freddy Bustamante MD         5/1/18 


Insulin Detemir Inj (Levemir Inj) 1,000 unit/ 10 ML Vial


10 UNITS SQ BID for Blood Sugar Management for 30 Days, VIAL 0 Refills


   Do not mix with any other Insulin.


   Prov: Freddy Bustamante MD         5/1/18 


Insulin Aspart Inj (Novolog Inj) 1,000 Unit/10 Ml Vial


1 UNITS SQ DAILY for Blood Sugar Management, #0 ML 0 Refills


   Prov: Freddy Bustamante MD         5/1/18


Disposition:  01 DISCHARGE HOME


Condition:  Stable











Freddy Bustamante MD May 1, 2018 13:56
12-Nov-2024 01:59